# Patient Record
Sex: FEMALE | Race: WHITE
[De-identification: names, ages, dates, MRNs, and addresses within clinical notes are randomized per-mention and may not be internally consistent; named-entity substitution may affect disease eponyms.]

---

## 2018-07-27 ENCOUNTER — HOSPITAL ENCOUNTER (OUTPATIENT)
Dept: HOSPITAL 97 - DS | Age: 68
End: 2018-07-27
Attending: OBSTETRICS & GYNECOLOGY
Payer: COMMERCIAL

## 2018-07-27 DIAGNOSIS — D05.12: Primary | ICD-10-CM

## 2018-07-27 PROCEDURE — 88305 TISSUE EXAM BY PATHOLOGIST: CPT

## 2018-07-27 PROCEDURE — 19083 BX BREAST 1ST LESION US IMAG: CPT

## 2018-07-27 PROCEDURE — 19084 BX BREAST ADD LESION US IMAG: CPT

## 2018-07-27 PROCEDURE — 0HBU3ZX EXCISION OF LEFT BREAST, PERCUTANEOUS APPROACH, DIAGNOSTIC: ICD-10-PCS

## 2018-07-27 NOTE — RAD REPORT
EXAM DESCRIPTION:  Ultrasound-guided vacuum assisted left breast core biopsy

 

CLINICAL HISTORY:  Breast mass, 10-11 o'clock position

R92.8

 

COMPARISON:  Follow Up Breast Axilla Comp dated 7/12/2018; 3D DIAG JODY LT UNI W/CAD dated 7/12/2018; 
BREAST/AXILLA, LIMITED dated 2/9/2018; 3D SCR JODY BILAT W/CAD dated 1/25/2018

 

FINDINGS:  Informed consent was obtained and time-out was performed.

 

The patient's left breast was prepped and draped in the usual sterile fashion. 1% lidocaine was used 
for local anesthetic purposes.

 

Utilizing aseptic technique and ultrasound guidance, vacuum assisted core biopsy device was used to o
btain 2 core specimen through the mass of interest in the upper inner quadrant of the left breast carlito
roximately 10-11 o'clock. A post biopsy clip was then placed.

 

All collected material was sent for cytology. Patient tolerated procedure well.

 

 

IMPRESSION:  Successful ultrasound guided vacuum assisted left breast 10-11 o'clock breast mass biops
y.

## 2018-07-27 NOTE — RAD REPORT
EXAM DESCRIPTION:  Ultrasound-guided vacuum assisted left breast core biopsy

 

CLINICAL HISTORY:  Breast mass, left breast 9 o'clock

MASS

 

COMPARISON:  BREAST/AXILLA, LIMITED dated 2/9/2018; 3D DIAG JODY LT UNI W/CAD dated 7/12/2018; 3D SCR 
JDOY BILAT W/CAD dated 1/25/2018; Follow Up Breast Axilla Comp dated 7/12/2018

 

FINDINGS:  Informed consent was obtained and time-out was performed.

 

The patient's left breast was prepped and draped in the usual sterile fashion. 1% lidocaine was used 
for local anesthetic purposes.

 

Utilizing aseptic technique and ultrasound guidance, vacuum assisted core biopsy device was used to o
btain 2 core specimens through the mass of interest, located 9 o'clock left breast periareolar region
. A post biopsy clip was then placed.

 

All collected material was sent for cytology. Patient tolerated procedure well.

 

 

IMPRESSION:  Successful ultrasound guided vacuum assisted left 9 o'clock breast mass biopsy.